# Patient Record
Sex: FEMALE | Race: WHITE | NOT HISPANIC OR LATINO | Employment: FULL TIME | RURAL
[De-identification: names, ages, dates, MRNs, and addresses within clinical notes are randomized per-mention and may not be internally consistent; named-entity substitution may affect disease eponyms.]

---

## 2018-01-03 ENCOUNTER — OUTSIDE FACILITY SERVICE (OUTPATIENT)
Dept: CARDIOLOGY | Facility: CLINIC | Age: 38
End: 2018-01-03

## 2018-01-03 PROCEDURE — 93018 CV STRESS TEST I&R ONLY: CPT | Performed by: INTERNAL MEDICINE

## 2018-01-03 PROCEDURE — 78452 HT MUSCLE IMAGE SPECT MULT: CPT | Performed by: INTERNAL MEDICINE

## 2021-06-16 ENCOUNTER — OFFICE VISIT (OUTPATIENT)
Dept: OBSTETRICS AND GYNECOLOGY | Facility: CLINIC | Age: 41
End: 2021-06-16

## 2021-06-16 ENCOUNTER — TELEPHONE (OUTPATIENT)
Dept: OBSTETRICS AND GYNECOLOGY | Facility: CLINIC | Age: 41
End: 2021-06-16

## 2021-06-16 VITALS — HEIGHT: 65 IN | SYSTOLIC BLOOD PRESSURE: 110 MMHG | DIASTOLIC BLOOD PRESSURE: 62 MMHG

## 2021-06-16 DIAGNOSIS — N89.8 VAGINAL DISCHARGE: Primary | ICD-10-CM

## 2021-06-16 DIAGNOSIS — O20.9 BLEEDING IN EARLY PREGNANCY: Primary | ICD-10-CM

## 2021-06-16 DIAGNOSIS — O20.0 THREATENED ABORTION: ICD-10-CM

## 2021-06-16 PROCEDURE — 99203 OFFICE O/P NEW LOW 30 MIN: CPT | Performed by: NURSE PRACTITIONER

## 2021-06-16 RX ORDER — PRENATAL VIT NO.126/IRON/FOLIC 28MG-0.8MG
TABLET ORAL DAILY
COMMUNITY

## 2021-06-16 RX ORDER — LEVOTHYROXINE SODIUM 0.12 MG/1
125 TABLET ORAL DAILY
COMMUNITY
End: 2021-07-16

## 2021-06-16 NOTE — PROGRESS NOTES
"Chief Complaint   Patient presents with   • New Patient   • TAB          HPI  Mitali Glover is a 40 y.o. female, , who presents with bleeding.  The amount of bleeding is described as light for 5 days without clotting, and cramping.  She describes cramping mild period like cramping in her lower abdomen.     Recent Tests:  She had a urine pregnancy test that was done 21 that was positive..    US today: Yes.  Findings showed single, viable IUP measuring 9w0d with FHR= 167bpm., Right ovarian cyst 44mm present.   I have personally evaluated the U/S and agree with the findings. MONICA Cope  She has had prenatal care with another practice, and plans to transfer care to us. [facility] .  She complains of cramping pain.  The pain is located in her lower abdomen.. Her past medical history is notable for ectopic pregnancy and spontaneous .  She does not have passage of tissue.  Rh Status: Positive per patient, we do not have record.    She also complains of increased urinary frequency.  She reports that she has been going 4 times per hour.      The additional following portions of the patient's history were reviewed and updated as appropriate: allergies, current medications, past family history, past medical history, past social history, past surgical history and problem list.    Review of Systems   Constitutional: Positive for fatigue.   Gastrointestinal: Negative.    Genitourinary: Positive for frequency, pelvic pain and vaginal bleeding.     All other systems reviewed and are negative.     I have reviewed and agree with the HPI, ROS, and historical information as entered above. MONICA Cope    Objective   /62   Ht 165.1 cm (65\")   LMP 2021 (Exact Date)   Breastfeeding No     Physical Exam  Constitutional:       Appearance: Normal appearance. She is normal weight.   Cardiovascular:      Rate and Rhythm: Normal rate and regular rhythm. "   Neurological:      Mental Status: She is alert.            Assessment and Plan    Problem List Items Addressed This Visit     None      Visit Diagnoses     Vaginal discharge    -  Primary    Relevant Orders    Chlamydia trachomatis, Neisseria gonorrhoeae, Trichomonas vaginalis, PCR - Swab, Cervix    ABO / Rh    Threatened         Relevant Orders    Chlamydia trachomatis, Neisseria gonorrhoeae, Trichomonas vaginalis, PCR - Swab, Cervix    ABO / Rh          1. Threatened AB  2. Pelvic Rest.  No douching, intercourse or use of tampons.  3. Call for an increase in bleeding, abdominal pain, or fever.  Type & Rh lab only today  Ultrasound findings reviewed with pt. Growth appropriate with LMP. FHR= 167bpm  Pt. 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 Recommended she talk with employer to see if other type of work available. Should try to limit lifting to no more than 25 lbs.   Keep NOB appt. As scheduled with     Counseling was given to patient for the following topics: diagnostic results, instructions for management and prognosis . Total time of the encounter was 45 minutes and 20 minutes was spend counseling.      Arelis Clement, MONICA  2021

## 2021-06-16 NOTE — TELEPHONE ENCOUNTER
PT IS BLEEDING IN PREGNANCY, WENT TO  AND WAS FINE, SHE IS COMING TO OUR OFFICE AND GETTING RECORDS - OTHER OFFICE SAID BLEEDING, BABY FHT WERE GOOD

## 2021-06-16 NOTE — TELEPHONE ENCOUNTER
S/w pt she states she is about 10 weeks pregnant. She has had vaginal spotting since last Saturday and 2 days ago she went to the [facility] ED for this and they did urine test and blood tests and stated to patient that she was about 10wks along, patient was unsure of HCG level results. ED told pt. The FHR was present and that she needed to F/U with her new OB care team. LMP 4/9 approximately, MBT is A positive, she is having cramping with the vaginal spotting, she also is having diarrhea and feels like she is going to pass out.     PT. Added on for today to see Dami and have U/S before.

## 2021-06-19 LAB
ABO GROUP BLD: NORMAL
C TRACH RRNA SPEC QL NAA+PROBE: NEGATIVE
Lab: NORMAL
N GONORRHOEA RRNA SPEC QL NAA+PROBE: NEGATIVE
RH BLD: POSITIVE
T VAGINALIS DNA SPEC QL NAA+PROBE: NEGATIVE

## 2021-06-23 ENCOUNTER — INITIAL PRENATAL (OUTPATIENT)
Dept: OBSTETRICS AND GYNECOLOGY | Facility: CLINIC | Age: 41
End: 2021-06-23

## 2021-06-23 VITALS — DIASTOLIC BLOOD PRESSURE: 62 MMHG | BODY MASS INDEX: 25.03 KG/M2 | WEIGHT: 150.4 LBS | SYSTOLIC BLOOD PRESSURE: 118 MMHG

## 2021-06-23 DIAGNOSIS — Z3A.10 10 WEEKS GESTATION OF PREGNANCY: Primary | ICD-10-CM

## 2021-06-23 DIAGNOSIS — O09.521 MULTIGRAVIDA OF ADVANCED MATERNAL AGE IN FIRST TRIMESTER: ICD-10-CM

## 2021-06-23 PROBLEM — M72.2 PLANTAR FASCIITIS, BILATERAL: Status: ACTIVE | Noted: 2018-09-03

## 2021-06-23 PROBLEM — F31.9 BIPOLAR DEPRESSION (HCC): Status: ACTIVE | Noted: 2018-08-31

## 2021-06-23 PROBLEM — G47.00 INSOMNIA: Status: ACTIVE | Noted: 2017-11-15

## 2021-06-23 PROBLEM — K21.9 GASTROESOPHAGEAL REFLUX DISEASE WITHOUT ESOPHAGITIS: Status: ACTIVE | Noted: 2018-05-06

## 2021-06-23 PROBLEM — R76.8 ELEVATED ANTINUCLEAR ANTIBODY (ANA) LEVEL: Status: ACTIVE | Noted: 2018-05-06

## 2021-06-23 PROBLEM — E03.9 HYPOTHYROIDISM: Status: ACTIVE | Noted: 2018-02-12

## 2021-06-23 PROBLEM — R92.8 ABNORMALITY OF RIGHT BREAST ON SCREENING MAMMOGRAM: Status: ACTIVE | Noted: 2017-11-15

## 2021-06-23 PROBLEM — F41.9 ANXIETY: Status: ACTIVE | Noted: 2018-02-12

## 2021-06-23 PROCEDURE — 99214 OFFICE O/P EST MOD 30 MIN: CPT | Performed by: NURSE PRACTITIONER

## 2021-06-23 RX ORDER — ZOLPIDEM TARTRATE 10 MG/1
TABLET ORAL
COMMUNITY
Start: 2021-06-17 | End: 2021-09-02 | Stop reason: HOSPADM

## 2021-06-23 NOTE — PROGRESS NOTES
Initial ob visit     CC- Here for care of pregnancy        Mitali Glover is a 40 y.o. female, , who presents for her first obstetrical visit.  Her last LMP was Patient's last menstrual period was 2021 (exact date)..    She reports that she is transferring care from Dr. Evans @ [facility].  NOB is currently scheduled with them for 21.   She says that she has requested records from their office, but they are not in chart.  She called while in room and they confirmed that NOB labs have not been drawn, as she has had labs with them prior to being seen with us.      OB History    Para Term  AB Living   8 5 4 1 1 5   SAB TAB Ectopic Molar Multiple Live Births       1            # Outcome Date GA Lbr Awais/2nd Weight Sex Delivery Anes PTL Lv   8 Current            7             6 Ectopic            5             4 Term            3 Term            2 Term            1 Term                Initial positive test date : 2021, UPT          This was a surprise pregnancy.  She says that she found out at pre- op prior to tubal.     Prior obstetric issues, potential pregnancy concerns:  Pre-eclampsia with previous pregnancy, G6  Family history of genetic issues (includes FOB): Patient reports that she has two brothers with heart murmurs.    Prior infections concerning in pregnancy (Rash, fever in last 2 weeks): She  reports that she recently found out that FOB has Hep C.   Varicella Hx - Yes  Prior testing for Cystic Fibrosis Carrier or Sickle Cell Trait- No  Prepregnancy BMI - Body mass index is 25.03 kg/m².  History of STD: no  Ultrasound Today: Yes.  Findings showed single, viable IUP measuring 10w0d with FHR= 178bpm.  I have personally evaluated the U/S and agree with the findings. Arelis Clement, APRN    Additional Pertinent History   Last Pap : She reports approx , she says that it wasn't normal but says that she was told it was nothing to worry about.  Last  "Completed Pap Smear     This patient has no relevant Health Maintenance data.        History of abnormal Pap smear: yes - she reports that she has a hx of a growth on her cervix and had to have removed  Family history of uterine, colon, breast, or ovarian cancer: yes - mother, Paternal Aunt- Breast   Feelings of Anxiety or Depression: yes - both anxiety and depression.  She says that her last OB gave her zoloft, but she has not taken it.  She says that she \"just has not been home.\"   Tobacco Usage?: No   Alcohol/Drug Use?: NO  Over the age of 35 at delivery: yes  Desires Genetic Screening: Cell Free DNA      [facility]  Past Medical History:   Diagnosis Date   • Hashimoto's disease    • Hypothyroid    • Preeclampsia        Current Outpatient Medications:   •  levothyroxine (SYNTHROID, LEVOTHROID) 125 MCG tablet, Take 125 mcg by mouth Daily., Disp: , Rfl:   •  prenatal vitamin (prenatal, CLASSIC, vitamin) tablet, Take  by mouth Daily., Disp: , Rfl:   •  zolpidem (AMBIEN) 10 MG tablet, , Disp: , Rfl:     The additional following portions of the patient's history were reviewed and updated as appropriate: allergies, current medications, past family history, past medical history, past social history, past surgical history and problem list.    Review of Systems   Review of Systems   Constitutional: Negative.    Gastrointestinal: Negative.    Genitourinary: Negative.    Psychiatric/Behavioral: The patient is nervous/anxious.      Current obstetric complaints : Nausea   All systems reviewed and otherwise normal.    I have reviewed and agree with the HPI, ROS, and historical information as entered above. Arelis Urbano, APRN    /62   Wt 68.2 kg (150 lb 6.4 oz)   LMP 04/09/2021 (Exact Date)   BMI 25.03 kg/m²     Physical Exam  General:  well developed; well nourished  no acute distress   Chest/Respiratory: No labored breathing, normal respiratory effort, normal appearance, no respiratory noises noted   Heart:  normal rate, " regular rhythm,  no murmurs, rubs, or gallops   Thyroid: not examined   Breasts:  Not performed.   Abdomen: Not performed.   Pelvis: Not performed.        Assessment and Plan    Problem List Items Addressed This Visit     None      Visit Diagnoses     10 weeks gestation of pregnancy    -  Primary    Relevant Orders    Obstetric Panel    HIV-1 / O / 2 Ag / Antibody 4th Generation    Urine Drug Screen - Urine, Clean Catch    Urine Culture - Urine, Urine, Clean Catch    [id number] PLUS Core+SCA+ESS - Blood,    Multigravida of advanced maternal age in first trimester        Relevant Orders    [id number] PLUS Core+SCA+ESS - Blood,          1. Pregnancy at 10w0d  2. Reviewed routine prenatal care with the office and educational materials given  3. Lab(s) Ordered  4. Discussed options for genetic testing including first trimester nuchal translucency screen, genetic disease carrier testing, quadruple screen, and [place].  5. Discontinue the use of all non-medicinal drugs and chemicals  6. Nausea/Vomiting - she does not desire medications at this time.  Discussed conservative ways to help with nausea.  7. Patient is on Prenatal vitamins  8. Activity recommendation : 150 minutes/week of moderate intensity aerobic activity unless we limit for bleeding, hypertension or other pregnancy complication   9.   Materni 21 labs with NOB labs today  10. RTC in 4 weeks with Dr.Schell Arelis Clement, APRN  06/23/2021

## 2021-06-24 ENCOUNTER — PATIENT MESSAGE (OUTPATIENT)
Dept: OBSTETRICS AND GYNECOLOGY | Facility: CLINIC | Age: 41
End: 2021-06-24

## 2021-06-25 LAB
ABO GROUP BLD: NORMAL
AMPHETAMINES UR QL SCN: NEGATIVE NG/ML
BACTERIA UR CULT: NO GROWTH
BACTERIA UR CULT: NORMAL
BARBITURATES UR QL SCN: NEGATIVE NG/ML
BASOPHILS # BLD AUTO: 0 X10E3/UL (ref 0–0.2)
BASOPHILS NFR BLD AUTO: 0 %
BENZODIAZ UR QL SCN: NEGATIVE NG/ML
BLD GP AB SCN SERPL QL: NEGATIVE
BZE UR QL SCN: NEGATIVE NG/ML
CANNABINOIDS UR QL SCN: NEGATIVE NG/ML
CREAT UR-MCNC: 142.8 MG/DL (ref 20–300)
EOSINOPHIL # BLD AUTO: 0 X10E3/UL (ref 0–0.4)
EOSINOPHIL NFR BLD AUTO: 0 %
ERYTHROCYTE [DISTWIDTH] IN BLOOD BY AUTOMATED COUNT: 13.9 % (ref 11.7–15.4)
HBV SURFACE AG SERPL QL IA: NEGATIVE
HCT VFR BLD AUTO: 38.2 % (ref 34–46.6)
HCV AB S/CO SERPL IA: <0.1 S/CO RATIO (ref 0–0.9)
HGB BLD-MCNC: 12.8 G/DL (ref 11.1–15.9)
HIV 1+2 AB+HIV1 P24 AG SERPL QL IA: NON REACTIVE
IMM GRANULOCYTES # BLD AUTO: 0 X10E3/UL (ref 0–0.1)
IMM GRANULOCYTES NFR BLD AUTO: 0 %
LABORATORY COMMENT REPORT: NORMAL
LYMPHOCYTES # BLD AUTO: 1.7 X10E3/UL (ref 0.7–3.1)
LYMPHOCYTES NFR BLD AUTO: 21 %
MCH RBC QN AUTO: 29.5 PG (ref 26.6–33)
MCHC RBC AUTO-ENTMCNC: 33.5 G/DL (ref 31.5–35.7)
MCV RBC AUTO: 88 FL (ref 79–97)
METHADONE UR QL SCN: NEGATIVE NG/ML
MONOCYTES # BLD AUTO: 0.4 X10E3/UL (ref 0.1–0.9)
MONOCYTES NFR BLD AUTO: 6 %
NEUTROPHILS # BLD AUTO: 5.7 X10E3/UL (ref 1.4–7)
NEUTROPHILS NFR BLD AUTO: 73 %
OPIATES UR QL SCN: NEGATIVE NG/ML
OXYCODONE+OXYMORPHONE UR QL SCN: NEGATIVE NG/ML
PCP UR QL: NEGATIVE NG/ML
PH UR: 5.9 [PH] (ref 4.5–8.9)
PLATELET # BLD AUTO: 279 X10E3/UL (ref 150–450)
PROPOXYPH UR QL SCN: NEGATIVE NG/ML
RBC # BLD AUTO: 4.34 X10E6/UL (ref 3.77–5.28)
RH BLD: POSITIVE
RPR SER QL: NON REACTIVE
RUBV IGG SERPL IA-ACNC: 1.32 INDEX
WBC # BLD AUTO: 7.8 X10E3/UL (ref 3.4–10.8)

## 2021-07-14 ENCOUNTER — TELEPHONE (OUTPATIENT)
Dept: OBSTETRICS AND GYNECOLOGY | Facility: CLINIC | Age: 41
End: 2021-07-14
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
## 2021-07-15 ENCOUNTER — ROUTINE PRENATAL (OUTPATIENT)
Dept: OBSTETRICS AND GYNECOLOGY | Facility: CLINIC | Age: 41
End: 2021-07-15

## 2021-07-15 VITALS — BODY MASS INDEX: 24.79 KG/M2 | SYSTOLIC BLOOD PRESSURE: 110 MMHG | DIASTOLIC BLOOD PRESSURE: 64 MMHG | WEIGHT: 149 LBS

## 2021-07-15 DIAGNOSIS — Z3A.13 13 WEEKS GESTATION OF PREGNANCY: Primary | ICD-10-CM

## 2021-07-15 DIAGNOSIS — Z87.828 HISTORY OF RECENT TRAUMA: ICD-10-CM

## 2021-07-15 DIAGNOSIS — E03.9 HYPOTHYROIDISM, UNSPECIFIED TYPE: ICD-10-CM

## 2021-07-15 DIAGNOSIS — O20.9 BLEEDING IN EARLY PREGNANCY: ICD-10-CM

## 2021-07-15 DIAGNOSIS — O09.529 ANTEPARTUM MULTIGRAVIDA OF ADVANCED MATERNAL AGE: ICD-10-CM

## 2021-07-15 DIAGNOSIS — F31.9 BIPOLAR DEPRESSION (HCC): ICD-10-CM

## 2021-07-15 DIAGNOSIS — Z87.59 HISTORY OF PREGNANCY INDUCED HYPERTENSION: ICD-10-CM

## 2021-07-15 PROBLEM — R92.8 ABNORMALITY OF RIGHT BREAST ON SCREENING MAMMOGRAM: Status: RESOLVED | Noted: 2017-11-15 | Resolved: 2021-07-15

## 2021-07-15 LAB
GLUCOSE UR STRIP-MCNC: NEGATIVE MG/DL
PROT UR STRIP-MCNC: NEGATIVE MG/DL

## 2021-07-15 PROCEDURE — 99213 OFFICE O/P EST LOW 20 MIN: CPT | Performed by: OBSTETRICS & GYNECOLOGY

## 2021-07-16 LAB — TSH SERPL DL<=0.005 MIU/L-ACNC: 4.44 UIU/ML (ref 0.27–4.2)

## 2021-07-16 RX ORDER — LEVOTHYROXINE SODIUM 0.15 MG/1
150 TABLET ORAL DAILY
Qty: 30 TABLET | Refills: 2 | Status: SHIPPED | OUTPATIENT
Start: 2021-07-16 | End: 2021-08-19

## 2021-08-12 ENCOUNTER — ROUTINE PRENATAL (OUTPATIENT)
Dept: OBSTETRICS AND GYNECOLOGY | Facility: CLINIC | Age: 41
End: 2021-08-12

## 2021-08-12 VITALS — DIASTOLIC BLOOD PRESSURE: 70 MMHG | BODY MASS INDEX: 24.79 KG/M2 | SYSTOLIC BLOOD PRESSURE: 118 MMHG | WEIGHT: 149 LBS

## 2021-08-12 DIAGNOSIS — Z87.828 HISTORY OF RECENT TRAUMA: ICD-10-CM

## 2021-08-12 DIAGNOSIS — O09.529 ANTEPARTUM MULTIGRAVIDA OF ADVANCED MATERNAL AGE: ICD-10-CM

## 2021-08-12 DIAGNOSIS — E03.9 HYPOTHYROIDISM, UNSPECIFIED TYPE: ICD-10-CM

## 2021-08-12 DIAGNOSIS — Z87.59 HISTORY OF PREGNANCY INDUCED HYPERTENSION: ICD-10-CM

## 2021-08-12 DIAGNOSIS — F31.9 BIPOLAR DEPRESSION (HCC): ICD-10-CM

## 2021-08-12 DIAGNOSIS — Z3A.17 17 WEEKS GESTATION OF PREGNANCY: Primary | ICD-10-CM

## 2021-08-12 LAB
GLUCOSE UR STRIP-MCNC: NEGATIVE MG/DL
PROT UR STRIP-MCNC: NEGATIVE MG/DL

## 2021-08-12 PROCEDURE — 99214 OFFICE O/P EST MOD 30 MIN: CPT | Performed by: OBSTETRICS & GYNECOLOGY
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
## 2021-08-13 LAB — TSH SERPL DL<=0.005 MIU/L-ACNC: 3.85 UIU/ML (ref 0.27–4.2)

## 2021-08-14 LAB
AFP ADJ MOM SERPL: 0.97
AFP INTERP SERPL-IMP: NORMAL
AFP INTERP SERPL-IMP: NORMAL
AFP SERPL-MCNC: 38.8 NG/ML
AGE AT DELIVERY: 43.2 YR
GA METHOD: NORMAL
GA: 17.1 WEEKS
IDDM PATIENT QL: NO
LABORATORY COMMENT REPORT: NORMAL
MULTIPLE PREGNANCY: NO
NEURAL TUBE DEFECT RISK FETUS: NORMAL %
RESULT: NORMAL

## 2021-08-18 ENCOUNTER — TELEPHONE (OUTPATIENT)
Dept: OBSTETRICS AND GYNECOLOGY | Facility: CLINIC | Age: 41
End: 2021-08-18

## 2021-08-18 DIAGNOSIS — O46.92 VAGINAL BLEEDING IN PREGNANCY, SECOND TRIMESTER: Primary | ICD-10-CM

## 2021-08-18 NOTE — TELEPHONE ENCOUNTER
Patient reports she went to [facility] for vaginal bleeding. Heart tones were present and no leakage of amniotic fluid. States she started bleeding Monday morning in early AM, so much so, that she was bleeding through her shorts. Bright red. Had cramping/pain with the bleeding. Denies abdominal trauma, heavy exercise, or intercourse prior to bleed. States that the bleeding slowed down by the time she made it to the ER. Has not had spotting, cramping, or bleeding since that day. MBT A positive.    [facility] [place] put patient on pelvic rest until f/u OB appointment.    In regards to our phone call to patient, she reports she takes her thyroid medication every day, as soon as she wakes.    Appointment with Dr. Ross tomorrow, following f/u U/S at 8:00am.

## 2021-08-18 NOTE — TELEPHONE ENCOUNTER
Pt called and stated that she missed a call from our office and her voicemail is messed up and she cant listen to it.     Pt also stated that she had been in the ER for bleeding and they said that the baby was fine and that she should let her OBGYN know.

## 2021-08-19 ENCOUNTER — ROUTINE PRENATAL (OUTPATIENT)
Dept: OBSTETRICS AND GYNECOLOGY | Facility: CLINIC | Age: 41
End: 2021-08-19

## 2021-08-19 VITALS — SYSTOLIC BLOOD PRESSURE: 112 MMHG | DIASTOLIC BLOOD PRESSURE: 70 MMHG

## 2021-08-19 DIAGNOSIS — Z87.59 HISTORY OF PREGNANCY INDUCED HYPERTENSION: ICD-10-CM

## 2021-08-19 DIAGNOSIS — Z87.828 HISTORY OF RECENT TRAUMA: ICD-10-CM

## 2021-08-19 DIAGNOSIS — Z3A.18 18 WEEKS GESTATION OF PREGNANCY: Primary | ICD-10-CM

## 2021-08-19 DIAGNOSIS — F31.9 BIPOLAR DEPRESSION (HCC): ICD-10-CM

## 2021-08-19 DIAGNOSIS — E03.9 HYPOTHYROIDISM, UNSPECIFIED TYPE: ICD-10-CM

## 2021-08-19 DIAGNOSIS — O46.92 VAGINAL BLEEDING IN PREGNANCY, SECOND TRIMESTER: ICD-10-CM

## 2021-08-19 DIAGNOSIS — O09.529 ANTEPARTUM MULTIGRAVIDA OF ADVANCED MATERNAL AGE: ICD-10-CM

## 2021-08-19 LAB
EXPIRATION DATE: 0
GLUCOSE UR STRIP-MCNC: NEGATIVE MG/DL
Lab: 0
PROT UR STRIP-MCNC: NEGATIVE MG/DL

## 2021-08-19 PROCEDURE — 99214 OFFICE O/P EST MOD 30 MIN: CPT | Performed by: OBSTETRICS & GYNECOLOGY

## 2021-08-19 RX ORDER — LEVOTHYROXINE SODIUM 175 UG/1
175 TABLET ORAL DAILY
Qty: 30 TABLET | Refills: 5 | Status: SHIPPED | OUTPATIENT
Start: 2021-08-19

## 2021-08-19 RX ORDER — LEVOTHYROXINE SODIUM 0.15 MG/1
175 TABLET ORAL DAILY
Qty: 30 TABLET | Refills: 0 | Status: SHIPPED | OUTPATIENT
Start: 2021-08-19 | End: 2021-08-19
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
## 2021-08-26 ENCOUNTER — TELEPHONE (OUTPATIENT)
Dept: OBSTETRICS AND GYNECOLOGY | Facility: CLINIC | Age: 41
End: 2021-08-26

## 2021-08-26 NOTE — TELEPHONE ENCOUNTER
She had been taking One a Day PNV OTC and she is not able to get it since she lost her job. She has a PNV Rx on file but it is chalky. That was not prescribed by Dr. Ross. She has [employer] and they will not cover a PNV with DHA. Options: try Vitafusion gummy, [place] gummy call her insurance and see what is covered or call a [facility] and see if they have a free PNV with DHA if so call me back.

## 2021-08-31 ENCOUNTER — HOSPITAL ENCOUNTER (INPATIENT)
Facility: HOSPITAL | Age: 41
LOS: 1 days | Discharge: HOME | End: 2021-09-02
Attending: OBSTETRICS & GYNECOLOGY | Admitting: OBSTETRICS & GYNECOLOGY

## 2021-08-31 PROCEDURE — 81001 URINALYSIS AUTO W/SCOPE: CPT | Performed by: OBSTETRICS & GYNECOLOGY

## 2021-09-01 ENCOUNTER — APPOINTMENT (OUTPATIENT)
Dept: WOMENS IMAGING | Facility: HOSPITAL | Age: 41
End: 2021-09-01

## 2021-09-01 PROBLEM — O46.92 VAGINAL BLEEDING IN PREGNANCY, SECOND TRIMESTER: Status: ACTIVE | Noted: 2021-09-01

## 2021-09-01 LAB
ABO GROUP BLD: NORMAL
ABO GROUP BLD: NORMAL
BACTERIA UR QL AUTO: ABNORMAL /HPF
BILIRUB UR QL STRIP: NEGATIVE
BLD GP AB SCN SERPL QL: NEGATIVE
CLARITY UR: CLEAR
COLOR UR: YELLOW
DEPRECATED RDW RBC AUTO: 42.8 FL (ref 37–54)
ERYTHROCYTE [DISTWIDTH] IN BLOOD BY AUTOMATED COUNT: 13.2 % (ref 12.3–15.4)
FIBRINOGEN PPP-MCNC: 365 MG/DL (ref 220–470)
GLUCOSE UR STRIP-MCNC: NEGATIVE MG/DL
HCT VFR BLD AUTO: 34.2 % (ref 34–46.6)
HGB BLD-MCNC: 11.7 G/DL (ref 12–15.9)
HGB UR QL STRIP.AUTO: ABNORMAL
HYALINE CASTS UR QL AUTO: ABNORMAL /LPF
KETONES UR QL STRIP: ABNORMAL
LEUKOCYTE ESTERASE UR QL STRIP.AUTO: ABNORMAL
MCH RBC QN AUTO: 30.6 PG (ref 26.6–33)
MCHC RBC AUTO-ENTMCNC: 34.2 G/DL (ref 31.5–35.7)
MCV RBC AUTO: 89.5 FL (ref 79–97)
NITRITE UR QL STRIP: NEGATIVE
PH UR STRIP.AUTO: 6 [PH] (ref 5–8)
PLATELET # BLD AUTO: 249 10*3/MM3 (ref 140–450)
PMV BLD AUTO: 10.1 FL (ref 6–12)
PROT UR QL STRIP: NEGATIVE
RBC # BLD AUTO: 3.82 10*6/MM3 (ref 3.77–5.28)
RBC # UR: ABNORMAL /HPF
REF LAB TEST METHOD: ABNORMAL
RH BLD: POSITIVE
RH BLD: POSITIVE
SARS-COV-2 RDRP RESP QL NAA+PROBE: NORMAL
SP GR UR STRIP: 1.03 (ref 1–1.03)
SQUAMOUS #/AREA URNS HPF: ABNORMAL /HPF
T&S EXPIRATION DATE: NORMAL
UROBILINOGEN UR QL STRIP: ABNORMAL
WBC # BLD AUTO: 8.52 10*3/MM3 (ref 3.4–10.8)
WBC UR QL AUTO: ABNORMAL /HPF

## 2021-09-01 PROCEDURE — 99222 1ST HOSP IP/OBS MODERATE 55: CPT | Performed by: OBSTETRICS & GYNECOLOGY

## 2021-09-01 PROCEDURE — 87635 SARS-COV-2 COVID-19 AMP PRB: CPT | Performed by: OBSTETRICS & GYNECOLOGY

## 2021-09-01 PROCEDURE — 25010000002 ONDANSETRON PER 1 MG: Performed by: OBSTETRICS & GYNECOLOGY

## 2021-09-01 PROCEDURE — 85384 FIBRINOGEN ACTIVITY: CPT | Performed by: OBSTETRICS & GYNECOLOGY

## 2021-09-01 PROCEDURE — 76811 OB US DETAILED SNGL FETUS: CPT

## 2021-09-01 PROCEDURE — 76811 OB US DETAILED SNGL FETUS: CPT | Performed by: OBSTETRICS & GYNECOLOGY

## 2021-09-01 PROCEDURE — 85027 COMPLETE CBC AUTOMATED: CPT | Performed by: OBSTETRICS & GYNECOLOGY

## 2021-09-01 PROCEDURE — 86900 BLOOD TYPING SEROLOGIC ABO: CPT | Performed by: OBSTETRICS & GYNECOLOGY

## 2021-09-01 PROCEDURE — 86901 BLOOD TYPING SEROLOGIC RH(D): CPT

## 2021-09-01 PROCEDURE — 86901 BLOOD TYPING SEROLOGIC RH(D): CPT | Performed by: OBSTETRICS & GYNECOLOGY

## 2021-09-01 PROCEDURE — 86850 RBC ANTIBODY SCREEN: CPT | Performed by: OBSTETRICS & GYNECOLOGY

## 2021-09-01 PROCEDURE — 25010000003 MAGNESIUM SULFATE 20 GM/500ML SOLUTION: Performed by: OBSTETRICS & GYNECOLOGY

## 2021-09-01 PROCEDURE — 86900 BLOOD TYPING SEROLOGIC ABO: CPT

## 2021-09-01 RX ORDER — NALOXONE HCL 0.4 MG/ML
0.4 VIAL (ML) INJECTION
Status: DISCONTINUED | OUTPATIENT
Start: 2021-09-01 | End: 2021-09-02 | Stop reason: HOSPADM

## 2021-09-01 RX ORDER — ACETAMINOPHEN 500 MG
1000 TABLET ORAL EVERY 4 HOURS PRN
Status: DISCONTINUED | OUTPATIENT
Start: 2021-09-01 | End: 2021-09-01

## 2021-09-01 RX ORDER — SODIUM CHLORIDE 0.9 % (FLUSH) 0.9 %
3 SYRINGE (ML) INJECTION EVERY 12 HOURS SCHEDULED
Status: DISCONTINUED | OUTPATIENT
Start: 2021-09-01 | End: 2021-09-02 | Stop reason: HOSPADM

## 2021-09-01 RX ORDER — ONDANSETRON 2 MG/ML
4 INJECTION INTRAMUSCULAR; INTRAVENOUS EVERY 8 HOURS PRN
Status: DISCONTINUED | OUTPATIENT
Start: 2021-09-01 | End: 2021-09-02 | Stop reason: HOSPADM

## 2021-09-01 RX ORDER — DEXTROSE AND SODIUM CHLORIDE 5; .2 G/100ML; G/100ML
75 INJECTION, SOLUTION INTRAVENOUS CONTINUOUS
Status: DISCONTINUED | OUTPATIENT
Start: 2021-09-01 | End: 2021-09-02 | Stop reason: HOSPADM

## 2021-09-01 RX ORDER — LIDOCAINE HYDROCHLORIDE 10 MG/ML
5 INJECTION, SOLUTION EPIDURAL; INFILTRATION; INTRACAUDAL; PERINEURAL AS NEEDED
Status: DISCONTINUED | OUTPATIENT
Start: 2021-09-01 | End: 2021-09-02 | Stop reason: HOSPADM

## 2021-09-01 RX ORDER — ZOLPIDEM TARTRATE 5 MG/1
5 TABLET ORAL NIGHTLY PRN
Status: DISCONTINUED | OUTPATIENT
Start: 2021-09-01 | End: 2021-09-02 | Stop reason: HOSPADM

## 2021-09-01 RX ORDER — MAGNESIUM SULFATE HEPTAHYDRATE 40 MG/ML
2 INJECTION, SOLUTION INTRAVENOUS CONTINUOUS
Status: DISCONTINUED | OUTPATIENT
Start: 2021-09-01 | End: 2021-09-01

## 2021-09-01 RX ORDER — SODIUM CHLORIDE 0.9 % (FLUSH) 0.9 %
10 SYRINGE (ML) INJECTION AS NEEDED
Status: DISCONTINUED | OUTPATIENT
Start: 2021-09-01 | End: 2021-09-02 | Stop reason: HOSPADM

## 2021-09-01 RX ORDER — ONDANSETRON 4 MG/1
4 TABLET, FILM COATED ORAL EVERY 8 HOURS PRN
Status: DISCONTINUED | OUTPATIENT
Start: 2021-09-01 | End: 2021-09-02 | Stop reason: HOSPADM

## 2021-09-01 RX ORDER — MAGNESIUM SULFATE HEPTAHYDRATE 40 MG/ML
2 INJECTION, SOLUTION INTRAVENOUS CONTINUOUS
Status: DISCONTINUED | OUTPATIENT
Start: 2021-09-01 | End: 2021-09-02 | Stop reason: HOSPADM

## 2021-09-01 RX ORDER — BUTORPHANOL TARTRATE 1 MG/ML
1 INJECTION, SOLUTION INTRAMUSCULAR; INTRAVENOUS EVERY 4 HOURS PRN
Status: DISCONTINUED | OUTPATIENT
Start: 2021-09-01 | End: 2021-09-02 | Stop reason: HOSPADM

## 2021-09-01 RX ORDER — ACETAMINOPHEN 325 MG/1
650 TABLET ORAL EVERY 4 HOURS PRN
Status: DISCONTINUED | OUTPATIENT
Start: 2021-09-01 | End: 2021-09-02 | Stop reason: HOSPADM

## 2021-09-01 RX ADMIN — MAGNESIUM SULFATE HEPTAHYDRATE 2 G/HR: 40 INJECTION, SOLUTION INTRAVENOUS at 10:29

## 2021-09-01 RX ADMIN — ACETAMINOPHEN 1000 MG: 500 TABLET ORAL at 11:50

## 2021-09-01 RX ADMIN — ACETAMINOPHEN 1000 MG: 500 TABLET ORAL at 06:31

## 2021-09-01 RX ADMIN — DEXTROSE AND SODIUM CHLORIDE 100 ML/HR: 5; 200 INJECTION, SOLUTION INTRAVENOUS at 01:01

## 2021-09-01 RX ADMIN — ONDANSETRON 4 MG: 2 INJECTION INTRAMUSCULAR; INTRAVENOUS at 20:14

## 2021-09-01 RX ADMIN — DEXTROSE AND SODIUM CHLORIDE 100 ML/HR: 5; 200 INJECTION, SOLUTION INTRAVENOUS at 11:26

## 2021-09-01 RX ADMIN — ACETAMINOPHEN 650 MG: 325 TABLET, FILM COATED ORAL at 18:40

## 2021-09-01 RX ADMIN — MAGNESIUM SULFATE HEPTAHYDRATE 2 G/HR: 40 INJECTION, SOLUTION INTRAVENOUS at 20:14

## 2021-09-01 RX ADMIN — ACETAMINOPHEN 1000 MG: 500 TABLET ORAL at 01:31

## 2021-09-01 RX ADMIN — ONDANSETRON 4 MG: 2 INJECTION INTRAMUSCULAR; INTRAVENOUS at 01:05

## 2021-09-01 RX ADMIN — LEVOTHYROXINE SODIUM 175 MCG: 100 TABLET ORAL at 06:28
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
## 2021-09-01 NOTE — H&P
"Mitali Glover  1980  [id number]  [id number]    CC: bleeding, cramping  HPI:  Patient is 40 y.o. female   currently at 20w0d presents with c/o vaginal bleeding and cramping.  Bleeding is bright red, sm-mod amt.  Cramping is 5/10, lower abd, intermittent.  Pt denies recent intercourse.  Pt with vag bleeding , US normal.  PNC comp by hx preeclampsia with prev pregnancies, hx  delivery, advanced maternal age, hypothyroidism.  Baby is moving well.    PMH:  Current meds: PNV, synthroid 175mcg/d  Illnesses: hypothyroidism, pericardial cyst  Surgeries: thoracoscopy with removal pericardial cyst, arm surg (trauma), lap lori, oral     surg  Allergies: NKDA    Past OB History:       OB History    Para Term  AB Living   8 5 4 1 2 5   SAB TAB Ectopic Molar Multiple Live Births   1 0 1 0 0 5      # Outcome Date GA Lbr Awais/2nd Weight Sex Delivery Anes PTL Lv   8 Current            7 SAB 21 6w0d          6  11 35w0d  2580 g (5 lb 11 oz) M Vag-Spont   JEWELS      Complications: Preeclampsia   5 Term 06 39w0d  3629 g (8 lb) F Vag-Spont   JEWELS   4 Term 05 38w0d  3232 g (7 lb 2 oz) F Vag-Spont  N JEWELS   3 Term 03 40w0d  3629 g (8 lb) F Vag-Spont  Y JEWELS   2 Ectopic 02           1 Term 00 39w3d  3487 g (7 lb 11 oz) F Vag-Spont  Y JEWELS            SH: tob neg , EtOH neg, drugs neg  FH: heart dz neg , diabetes pos , cancer pos    General ROS:  Cramping, vag bleeding, HA, edema.   All other systems reviewed and are negative.      Physical Examination: General appearance - alert, well appearing, and in no distress  Vital signs - /61   Pulse 76   Temp 98 °F (36.7 °C) (Oral)   Resp 16   Ht 165.1 cm (65\")   Wt 68 kg (150 lb)   LMP 2021 (Exact Date)   BMI 24.96 kg/m²   HEENT: normocephalic, atraumatic,oropharynx clear, appearance of ears and nose normal  Neck - supple, no significant adenopathy, no thyromegaly  Lymphatics - no palpable " lymphadenopathy in the neck or groin, no hepatosplenomegaly  Chest - clear to auscultation, no wheezes, rales or rhonchi, respiratory effort non-labored  Heart - normal rate, regular rhythm, no murmurs, rubs, clicks or gallops, no JVD, no lower extremity edema  Abdomen - soft, nontender, nondistended, no masses, no hepatosplenomegaly  no rebound tenderness noted, bowel sounds normal  Vaginal Exam: cl/th, small amt blood in vault ,external genitalia normal  Extremities - no pedal edema noted, no calf tend  Skin -warm and dry, normal coloration and turgor, no rashes, no suspicious skin lesions noted      Radiology US: anterior/low lying placenta (TV US), poss subchorionic clot, normal fluid, active fetus    Assessment 1)IUP 20 0/7 weeks   2)bleeding/cramping- prob due to subchorionic bleed   3)adv mat age   4)hx preeclampsia   5)hx  delivery    Plan 1)admit   2)magnesium   3)PDC consult and scan in am   4)labs   5)discussed with Dr.s Marti and Vandana Botello MD  2021  00:24 EDT

## 2021-09-02 ENCOUNTER — HOSPITAL ENCOUNTER (OUTPATIENT)
Dept: WOMENS IMAGING | Facility: HOSPITAL | Age: 41
End: 2021-09-02

## 2021-09-02 VITALS
HEIGHT: 65 IN | WEIGHT: 150 LBS | DIASTOLIC BLOOD PRESSURE: 56 MMHG | SYSTOLIC BLOOD PRESSURE: 122 MMHG | HEART RATE: 82 BPM | BODY MASS INDEX: 24.99 KG/M2 | TEMPERATURE: 98.9 F | OXYGEN SATURATION: 96 % | RESPIRATION RATE: 18 BRPM

## 2021-09-02 PROCEDURE — 99238 HOSP IP/OBS DSCHRG MGMT 30/<: CPT | Performed by: OBSTETRICS & GYNECOLOGY

## 2021-09-02 PROCEDURE — 25010000003 MAGNESIUM SULFATE 20 GM/500ML SOLUTION: Performed by: OBSTETRICS & GYNECOLOGY

## 2021-09-02 RX ADMIN — MAGNESIUM SULFATE HEPTAHYDRATE 2 G/HR: 40 INJECTION, SOLUTION INTRAVENOUS at 06:22

## 2021-09-02 RX ADMIN — ACETAMINOPHEN 650 MG: 325 TABLET, FILM COATED ORAL at 04:19

## 2021-09-02 RX ADMIN — ACETAMINOPHEN 650 MG: 325 TABLET, FILM COATED ORAL at 11:05

## 2021-09-02 RX ADMIN — LEVOTHYROXINE SODIUM 175 MCG: 100 TABLET ORAL at 06:19

## 2021-09-02 NOTE — CASE MANAGEMENT/SOCIAL WORK
Continued Stay Note  [facility]     Patient Name: Mitali Glover  MRN: [id number]  Today's Date: 9/2/2021    Admit Date: 8/31/2021    Discharge Plan     Row Name 09/02/21 1020       Plan    Plan  CHRISTINE follow-up    Plan Comments  SW spoke with pt today prior to discharge.  Pt confirmed she does have a DVO against FOB.  She had not planned on being admitted and doesn't have the paperwork with her.  She is aware she can present it if needed at a future MD appointment.  She stated she has her own apartment where she endorses feeling safe.  She plans to return home today and denies having any needs of SW.    Final Discharge Disposition Code  30 - still a patient        Discharge Codes    No documentation.       Expected Discharge Date and Time     Expected Discharge Date Expected Discharge Time    Sep 2, 2021             SHEREE Campbell

## 2021-09-02 NOTE — DISCHARGE SUMMARY
2021  HD:1  40 y.o. female  at 20w1d    Subjective   Mitali has no complaints. She has only had a small amount of pink blood with wiping. +FM.   US with PDC yesterday normal, except a 2cm CHRISTOPHER at leading edgeof placenta. No previa. CL normal.        Objective   Temp: Temp:  [97.7 °F (36.5 °C)-98.2 °F (36.8 °C)] 97.7 °F (36.5 °C) Temp src: Axillary   BP: BP: ()/(48-65) 104/57        Pulse: Heart Rate:  [68-81] 80  RR: Resp:  [15-16] 16    General:  nad   Abdomen: Gravid, nontender         Lab Results   Component Value Date    WBC 8.52 2021    HGB 11.7 (L) 2021    HCT 34.2 2021    MCV 89.5 2021     2021    HEPBSAG Negative 2021                 +FHT    Assessment  1.   40 y.o. yo female  at 20w1d  2. Small CHRISTOPHER with bleeding. Stable.     Plan  Will stop magnesium, and plan on home later today as long as remains stable. Pelvic rest, and decreased activity. She has FU sched next week.       This note has been electronically signed.    Leighann Ross MD  2021

## 2021-09-02 NOTE — DISCHARGE INSTRUCTIONS
Pelvic rest.  Decreased activity.  If you have HEAVY bleeding, call labor curry or come in to be evaluated ([phone]).

## 2021-09-03 NOTE — PAYOR COMM NOTE
"Mitali Rm (40 y.o. Female)   ID: [id number]    Pt DC home on 21  Faxed By: Marge Sheppard [phone] p, [phone] f      Date of Birth Social Security Number Address Home Phone MRN    1980  [address] [facility] [address] [phone] [id number]    [place] Marital Status          Unknown        Admission Date Admission Type Admitting Provider Attending Provider Department, Room/Bed    21 Elective Leighann Ross MD  [facility] ANTEPARTUM, N334/1    Discharge Date Discharge Disposition Discharge Destination        2021 Home or Self Care              Attending Provider: (none)   Allergies: Latex    Isolation: None   Infection: None   Code Status: Prior    Ht: 165.1 cm (65\")   Wt: 68 kg (150 lb)    Admission Cmt: None   Principal Problem: None                Active Insurance as of 2021     Primary Coverage     Payor Plan Insurance Group Employer/Plan Group    [place]      Payor Plan Address Payor Plan Phone Number Payor Plan Fax Number Effective Dates    [address] [address] [phone]  1/3/2018 - None Entered    [facility] [address]       Subscriber Name Subscriber Birth Date Member ID       MITALI RM 1980 [id number]                 Emergency Contacts      (Rel.) Home Phone Work Phone Mobile Phone    Justina Pollard (Daughter) -- -- [phone]               Discharge Summary      Leighann Ross MD at 21 0842          2021  HD:1  40 y.o. female  at 20w1d    Subjective   Mitali has no complaints. She has only had a small amount of pink blood with wiping. +FM.   US with PDC yesterday normal, except a 2cm CHRISTOPHER at leading edgeof placenta. No previa. CL normal.        Objective   Temp: Temp:  [97.7 °F (36.5 °C)-98.2 °F (36.8 °C)] 97.7 °F (36.5 °C) Temp src: Axillary   BP: BP: ()/(48-65) 104/57        Pulse: Heart Rate:  [68-81] 80  RR: Resp:  [15-16] 16    General:  nad   Abdomen: " Gravid, nontender         Lab Results   Component Value Date    WBC 8.52 2021    HGB 11.7 (L) 2021    HCT 34.2 2021    MCV 89.5 2021     2021    HEPBSAG Negative 2021                 +FHT    Assessment  1.   40 y.o. yo female  at 20w1d  2. Small CHRISTOPHER with bleeding. Stable.     Plan  Will stop magnesium, and plan on home later today as long as remains stable. Pelvic rest, and decreased activity. She has FU sched next week.       This note has been electronically signed.    Leighann Ross MD  2021    Electronically signed by Leighann Ross MD at 21 0842

## 2021-09-07 ENCOUNTER — ROUTINE PRENATAL (OUTPATIENT)
Dept: OBSTETRICS AND GYNECOLOGY | Facility: CLINIC | Age: 41
End: 2021-09-07

## 2021-09-07 VITALS — BODY MASS INDEX: 24.79 KG/M2 | WEIGHT: 149 LBS | DIASTOLIC BLOOD PRESSURE: 64 MMHG | SYSTOLIC BLOOD PRESSURE: 110 MMHG

## 2021-09-07 DIAGNOSIS — Z3A.20 20 WEEKS GESTATION OF PREGNANCY: ICD-10-CM

## 2021-09-07 DIAGNOSIS — E07.9 THYROID DYSFUNCTION IN PREGNANCY, ANTEPARTUM: Primary | ICD-10-CM

## 2021-09-07 DIAGNOSIS — O41.8X20 SUBCHORIONIC HEMATOMA IN SECOND TRIMESTER, SINGLE OR UNSPECIFIED FETUS: ICD-10-CM

## 2021-09-07 DIAGNOSIS — O46.8X2 SUBCHORIONIC HEMATOMA IN SECOND TRIMESTER, SINGLE OR UNSPECIFIED FETUS: ICD-10-CM

## 2021-09-07 DIAGNOSIS — O99.280 THYROID DYSFUNCTION IN PREGNANCY, ANTEPARTUM: Primary | ICD-10-CM

## 2021-09-07 PROCEDURE — 99213 OFFICE O/P EST LOW 20 MIN: CPT | Performed by: NURSE PRACTITIONER

## 2021-09-07 NOTE — PROGRESS NOTES
OB FOLLOW UP  CC- Here for care of pregnancy        Mitali Glover is a 40 y.o.  20w6d patient being seen today for her obstetrical follow up visit. Patient reports she has continued to have scant bright red bleeding and occasional nausea. She was discharged from L&D on  for CHRISTOPHER and vaginal bleeding and possible JOJO.  Pt did not leave urine sample this morning.     Her prenatal care is complicated by (and status) :    Patient Active Problem List   Diagnosis   • Anxiety   • Bipolar depression (CMS/HCC)   • Elevated antinuclear antibody (JAN) level   • Gastroesophageal reflux disease without esophagitis   • Hypothyroidism   • Insomnia   • Plantar fasciitis, bilateral   • Antepartum multigravida of advanced maternal age   • History of pregnancy induced hypertension   • History of recent trauma   • Vaginal bleeding in pregnancy, second trimester       Ultrasound Today: no    ROS -   Patient Denies: Loss of Fluid, Vision Changes, Headaches, Nausea , Vomiting  and Contractions  Fetal Movement : normal  All other systems reviewed and are negative.       The additional following portions of the patient's history were reviewed and updated as appropriate: allergies, current medications, past family history, past medical history, past social history, past surgical history and problem list.    I have reviewed and agree with the HPI, ROS, and historical information as entered above. Chanel Self, APRN    /64   Wt 67.6 kg (149 lb)   LMP 2021 (Exact Date)   BMI 24.79 kg/m²       EXAM:     FHT: wnl BPM     Urine glucose/protein: See prenatal flowsheet       Assessment and Plan    Problem List Items Addressed This Visit     None      Visit Diagnoses     Thyroid dysfunction in pregnancy, antepartum    -  Primary    Relevant Orders    TSH    Subchorionic hematoma in second trimester, single or unspecified fetus        Relevant Orders    US Ob Limited 1 + Fetuses    20 weeks gestation of pregnancy               1. Pregnancy at 20w6d  2. Fetal status reassuring.   MBT +  3. Activity and Exercise discussed.  Rev pelvic rest, no lifting, straining.  Return in about 4 weeks (around 10/5/2021) for US DRAGAN Self, APRN  09/07/2021

## 2021-09-08 LAB — TSH SERPL DL<=0.005 MIU/L-ACNC: 11.1 UIU/ML (ref 0.45–4.5)

## 2021-09-09 ENCOUNTER — TELEPHONE (OUTPATIENT)
Dept: OBSTETRICS AND GYNECOLOGY | Facility: CLINIC | Age: 41
End: 2021-09-09

## 2021-09-09 DIAGNOSIS — Z3A.21 21 WEEKS GESTATION OF PREGNANCY: Primary | ICD-10-CM

## 2021-09-12 ENCOUNTER — HOSPITAL ENCOUNTER (EMERGENCY)
Facility: HOSPITAL | Age: 41
Discharge: STILL ADMITTED OR ADMINISTRATIVE | End: 2021-09-12

## 2021-09-12 ENCOUNTER — HOSPITAL ENCOUNTER (OUTPATIENT)
Facility: HOSPITAL | Age: 41
Setting detail: OBSERVATION
Discharge: HOME | End: 2021-09-13
Attending: OBSTETRICS & GYNECOLOGY | Admitting: OBSTETRICS & GYNECOLOGY

## 2021-09-12 VITALS
RESPIRATION RATE: 16 BRPM | TEMPERATURE: 97.8 F | WEIGHT: 150 LBS | OXYGEN SATURATION: 100 % | HEIGHT: 65 IN | SYSTOLIC BLOOD PRESSURE: 109 MMHG | BODY MASS INDEX: 24.99 KG/M2 | DIASTOLIC BLOOD PRESSURE: 58 MMHG | HEART RATE: 84 BPM

## 2021-09-12 PROBLEM — N93.9 VAGINAL BLEEDING: Status: ACTIVE | Noted: 2021-09-12

## 2021-09-12 LAB
ABO GROUP BLD: NORMAL
BLD GP AB SCN SERPL QL: NEGATIVE
DEPRECATED RDW RBC AUTO: 43 FL (ref 37–54)
ERYTHROCYTE [DISTWIDTH] IN BLOOD BY AUTOMATED COUNT: 13.2 % (ref 12.3–15.4)
FIBRINOGEN PPP-MCNC: 404 MG/DL (ref 220–470)
HCT VFR BLD AUTO: 34.3 % (ref 34–46.6)
HGB BLD-MCNC: 11.9 G/DL (ref 12–15.9)
MCH RBC QN AUTO: 31.4 PG (ref 26.6–33)
MCHC RBC AUTO-ENTMCNC: 34.7 G/DL (ref 31.5–35.7)
MCV RBC AUTO: 90.5 FL (ref 79–97)
PLATELET # BLD AUTO: 271 10*3/MM3 (ref 140–450)
PMV BLD AUTO: 9.7 FL (ref 6–12)
RBC # BLD AUTO: 3.79 10*6/MM3 (ref 3.77–5.28)
RH BLD: POSITIVE
SARS-COV-2 RDRP RESP QL NAA+PROBE: NORMAL
T&S EXPIRATION DATE: NORMAL
T4 FREE SERPL-MCNC: 1.18 NG/DL (ref 0.93–1.7)
TSH SERPL DL<=0.05 MIU/L-ACNC: 3.76 UIU/ML (ref 0.27–4.2)
WBC # BLD AUTO: 10.09 10*3/MM3 (ref 3.4–10.8)

## 2021-09-12 PROCEDURE — 86850 RBC ANTIBODY SCREEN: CPT | Performed by: OBSTETRICS & GYNECOLOGY

## 2021-09-12 PROCEDURE — G0378 HOSPITAL OBSERVATION PER HR: HCPCS

## 2021-09-12 PROCEDURE — 84439 ASSAY OF FREE THYROXINE: CPT | Performed by: OBSTETRICS & GYNECOLOGY

## 2021-09-12 PROCEDURE — 85027 COMPLETE CBC AUTOMATED: CPT | Performed by: OBSTETRICS & GYNECOLOGY

## 2021-09-12 PROCEDURE — 84443 ASSAY THYROID STIM HORMONE: CPT | Performed by: OBSTETRICS & GYNECOLOGY

## 2021-09-12 PROCEDURE — 86900 BLOOD TYPING SEROLOGIC ABO: CPT | Performed by: OBSTETRICS & GYNECOLOGY

## 2021-09-12 PROCEDURE — 99219 PR INITIAL OBSERVATION CARE/DAY 50 MINUTES: CPT | Performed by: OBSTETRICS & GYNECOLOGY

## 2021-09-12 PROCEDURE — 85384 FIBRINOGEN ACTIVITY: CPT | Performed by: OBSTETRICS & GYNECOLOGY

## 2021-09-12 PROCEDURE — 86901 BLOOD TYPING SEROLOGIC RH(D): CPT | Performed by: OBSTETRICS & GYNECOLOGY

## 2021-09-12 PROCEDURE — 87635 SARS-COV-2 COVID-19 AMP PRB: CPT | Performed by: OBSTETRICS & GYNECOLOGY

## 2021-09-12 PROCEDURE — C9803 HOPD COVID-19 SPEC COLLECT: HCPCS

## 2021-09-12 RX ORDER — SODIUM CHLORIDE 0.9 % (FLUSH) 0.9 %
10 SYRINGE (ML) INJECTION EVERY 12 HOURS SCHEDULED
Status: DISCONTINUED | OUTPATIENT
Start: 2021-09-12 | End: 2021-09-12

## 2021-09-12 RX ORDER — OMEGA-3S/DHA/EPA/FISH OIL/D3 300MG-1000
400 CAPSULE ORAL DAILY
COMMUNITY

## 2021-09-12 RX ORDER — SODIUM CHLORIDE 0.9 % (FLUSH) 0.9 %
1-10 SYRINGE (ML) INJECTION AS NEEDED
Status: DISCONTINUED | OUTPATIENT
Start: 2021-09-12 | End: 2021-09-12

## 2021-09-12 RX ORDER — PRENATAL VIT/IRON FUM/FOLIC AC 27MG-0.8MG
1 TABLET ORAL DAILY
Status: DISCONTINUED | OUTPATIENT
Start: 2021-09-12 | End: 2021-09-13 | Stop reason: HOSPADM

## 2021-09-12 RX ADMIN — LEVOTHYROXINE SODIUM 175 MCG: 100 TABLET ORAL at 20:37

## 2021-09-12 RX ADMIN — PRENATAL VITAMINS-IRON FUMARATE 27 MG IRON-FOLIC ACID 0.8 MG TABLET 1 TABLET: at 20:37

## 2021-09-12 NOTE — H&P
[facility]  Obstetric History and Physical    Referring Provider: Leighann Ross MD      Chief Complaint   Patient presents with   • Vaginal Bleeding       Subjective     Patient is a 40 y.o. female  currently at 21w4d, who presents with c/o vaginal bleeding.  Patient reports having bright red vaginal bleeding yesterday, moderate amounts(light menses) without abdominal pain.  She reports having another episode this morning of bright red bleeding without abdominal cramping.  Patient denies recent intercourse.  Patient denies recent trauma, fever, urinary symptoms, or any other concerns.  Patient admitted on  after abdominal pain and vaginal bleeding.  PDC confirmed subchorionic hemorrhage.  Patient has had no subsequent vaginal bleeding until yesterday.  Past medical history significant for hypothyroidism, history of preeclampsia with previous pregnancies, history of  delivery, and AMA.  Patient lives 1-1/2 hours from [facility].        The following portions of the patients history were reviewed and updated as appropriate: current medications, allergies, past medical history, past surgical history, past family history, past social history and problem list .       Prenatal Information:   Maternal Prenatal Labs  Blood Type No results found for: ABO   Rh Status No results found for: RH   Antibody Screen No results found for: ABSCRN   Gonnorhea No results found for: GCCX   Chlamydia No results found for: CLAMYDCU   RPR No results found for: RPR   Syphilis Antibody No results found for: SYPHILIS   Rubella No results found for: RUBELLAIGGIN   Hepatitis B Surface Antigen No results found for: HEPBSAG   HIV-1 Antibody No results found for: LABHIV1   Hepatitis C Antibody No results found for: HEPCAB   Rapid Urin Drug Screen No results found for: AMPMETHU, BARBITSCNUR, LABBENZSCN, LABMETHSCN, LABOPIASCN, THCURSCR, COCAINEUR, AMPHETSCREEN, PROPOXSCN, BUPRENORSCNU, METAMPSCNUR,  OXYCODONESCN, TRICYCLICSCN   Group B Strep Culture No results found for: GBSANTIGEN           External Prenatal Results     Pregnancy Outside Results - Transcribed From Office Records - See Scanned Records For Details     Test Value Date Time    ABO  A  21 0151    Rh  Positive  21 0151    Antibody Screen  Negative  21 0058       Negative  21 1646    Varicella IgG       Rubella  1.32 index 21 1646    Hgb  11.7 g/dL 21 0058       12.8 g/dL 21 1646    Hct  34.2 % 21 0058       38.2 % 21 1646    Glucose Fasting GTT       Glucose Tolerance Test 1 hour       Glucose Tolerance Test 3 hour       Gonorrhea (discrete)  Negative  21     Chlamydia (discrete)  Negative  21     RPR  Non Reactive  21 1646    VDRL       Syphilis Antibody       HBsAg  Negative  21 1646    Herpes Simplex Virus PCR       Herpes Simplex VIrus Culture       HIV  Non Reactive  21 1646    Hep C RNA Quant PCR       Hep C Antibody  <0.1 s/co ratio 21 1646    AFP  38.8 ng/mL 21 1358    Group B Strep       GBS Susceptibility to Clindamycin       GBS Susceptibility to Erythromycin       Fetal Fibronectin       Genetic Testing, Maternal Blood             Drug Screening     Test Value Date Time    Urine Drug Screen       Amphetamine Screen  Negative ng/mL 21 1646    Barbiturate Screen  Negative ng/mL 21 1646    Benzodiazepine Screen  Negative ng/mL 21 1646    Methadone Screen  Negative ng/mL 21 1646    Phencyclidine Screen  Negative ng/mL 21 1646    Opiates Screen       THC Screen       Cocaine Screen       Propoxyphene Screen  Negative ng/mL 21 1646    Buprenorphine Screen       Methamphetamine Screen       Oxycodone Screen       Tricyclic Antidepressants Screen             Legend    ^: Historical                          Past OB History:       OB History    Para Term  AB Living   8 5 4 1 2 5   SAB TAB Ectopic Molar  Multiple Live Births   1 0 1 0 0 5      # Outcome Date GA Lbr Awais/2nd Weight Sex Delivery Anes PTL Lv   8 Current            7 SAB 21 6w0d          6  11 35w0d  2580 g (5 lb 11 oz) M Vag-Spont   JEWELS      Complications: Preeclampsia   5 Term 06 39w0d  3629 g (8 lb) F Vag-Spont   JEWELS   4 Term 05 38w0d  3232 g (7 lb 2 oz) F Vag-Spont  N JEWELS   3 Term 03 40w0d  3629 g (8 lb) F Vag-Spont  Y JEWELS   2 Ectopic 02           1 Term 00 39w3d  3487 g (7 lb 11 oz) F Vag-Spont  Y JEWELS       Past Medical History: Past Medical History:   Diagnosis Date   • Hashimoto's disease    • Hx of preeclampsia, prior pregnancy, currently pregnant, unspecified trimester    • Hypothyroid    • Preeclampsia     Hx of w/ previous pregnancy      Past Surgical History Past Surgical History:   Procedure Laterality Date   • CARDIAC SURGERY      cyst removal   • CHOLECYSTECTOMY     • OTHER SURGICAL HISTORY Left     Arm surgery    • WISDOM TOOTH EXTRACTION        Family History: Family History   Problem Relation Age of Onset   • Hypertension Father    • Hyperlipidemia Father    • Hypothyroidism Father    • Diabetes Father    • Hypertension Mother    • Hyperlipidemia Mother    • Breast cancer Mother    • Pancreatic cancer Mother    • Hypothyroidism Mother    • Diabetes Mother    • Hypertension Brother    • Hyperlipidemia Brother    • Hypothyroidism Brother    • Hypertension Sister    • Hyperlipidemia Sister    • Hypothyroidism Sister    • Diabetes Sister    • Colon cancer Maternal Grandfather    • Hypothyroidism Maternal Grandfather    • Hypothyroidism Maternal Grandmother    • Hypothyroidism Paternal Grandmother    • Hypothyroidism Paternal Grandfather    • Breast cancer Paternal Aunt       Social History:  reports that she has never smoked. She has never used smokeless tobacco.   reports previous alcohol use.   reports no history of drug use.                   General ROS Negative Findings:Headaches, Visual  Changes, Epigastric pain, Anorexia, Nausia/Vomiting and ROM    ROS     All other systems have been reviewed and are neg  Objective       Vital Signs Range for the last 24 hours  Temperature: Temp:  [97.8 °F (36.6 °C)-98.3 °F (36.8 °C)] 98.3 °F (36.8 °C)   Temp Source: Temp src: Oral   BP: BP: (109-111)/(55-58) 111/55   Pulse: Heart Rate:  [77-84] 77   Respirations: Resp:  [16] 16   SPO2: SpO2:  [100 %] 100 %   O2 Amount (l/min):     O2 Devices     Weight: Weight:  [68 kg (150 lb)] 68 kg (150 lb)     Physical Examination:   General:   alert, appears stated age and cooperative   Skin:   normal   HEENT:  Sclera clear   Lungs:   clear to auscultation bilaterally   Heart:   regular rate and rhythm, S1, S2 normal, no murmur, click, rub or gallop   Gastrointestinal:  Abdomen soft, gravid uterus, no guarding, rebound benign exam   Lower Extremities:  No edema, no calf test range of motion   : External genitalia: normal general appearance  Uterus: enlarged   Musculoskeletal:   Full range of motion upper lower extremity.   Neuro:  Focal deficits, DTR 2+4 no clonus         Presentation:    Cervix: Exam by:     Dilation:  Closed   Effacement:  Thick firm   Station:  Not engaged  Small amount of blood noted on glove       Fetal Heart Rate Assessment   Method:     Beats/min:     Baseline:     Varibility:     Accels:     Decels:     Tracing Category:       Uterine Assessment   Method:     Frequency (min):     Ctx Count in 10 min:     Duration:     Intensity:     Intensity by IUPC:     Resting Tone:     Resting Tone by IUPC:     [place] Units:       Laboratory Results:   Lab Results (last 24 hours)     ** No results found for the last 24 hours. **        Radiology Review:   Imaging Results (Last 24 Hours)     ** No results found for the last 24 hours. **        Other Studies:    Assessment/Plan       Hypothyroidism    Antepartum multigravida of advanced maternal age    Vaginal bleeding in pregnancy, second trimester    Vaginal  bleeding        Assessment:  1.  Intrauterine pregnancy at 21w4d weeks gestation with reassuring fetal status.    2.  Recurrent vaginal bleeding (history of subchorionic hemorrhage)       Patient admitted on  treated with magnesium sulfate for vaginal bleeding and uterine contractions.  3.  AMA  4.  Hypothyroidism (elevated TSH on 21, appointment made for endocrinology)  5.  History of  delivery at 35 weeks gestation  6.  Patient lives one and half hours from [facility]  Plan:  1.  Admit observation, continuous toco, baseline labs, repeat TSH, T4, PDC scan in a.m.  2. Plan of care has been reviewed with patient.  3.  Risks, benefits of treatment plan have been discussed.  4.  All questions have been answered.  5   discussed with Dr. Harinder Xie DO  2021  18:02 EDT

## 2021-09-13 ENCOUNTER — TELEPHONE (OUTPATIENT)
Dept: OBSTETRICS AND GYNECOLOGY | Facility: CLINIC | Age: 41
End: 2021-09-13

## 2021-09-13 ENCOUNTER — HOSPITAL ENCOUNTER (OUTPATIENT)
Facility: HOSPITAL | Age: 41
End: 2021-09-13
Attending: OBSTETRICS & GYNECOLOGY | Admitting: OBSTETRICS & GYNECOLOGY

## 2021-09-13 ENCOUNTER — APPOINTMENT (OUTPATIENT)
Dept: WOMENS IMAGING | Facility: HOSPITAL | Age: 41
End: 2021-09-13

## 2021-09-13 VITALS
DIASTOLIC BLOOD PRESSURE: 55 MMHG | TEMPERATURE: 98.1 F | RESPIRATION RATE: 16 BRPM | HEART RATE: 80 BPM | SYSTOLIC BLOOD PRESSURE: 108 MMHG

## 2021-09-13 PROBLEM — N93.9 VAGINAL BLEEDING: Status: RESOLVED | Noted: 2021-09-12 | Resolved: 2021-09-13

## 2021-09-13 PROCEDURE — G0378 HOSPITAL OBSERVATION PER HR: HCPCS

## 2021-09-13 PROCEDURE — 76816 OB US FOLLOW-UP PER FETUS: CPT | Performed by: OBSTETRICS & GYNECOLOGY

## 2021-09-13 PROCEDURE — 76816 OB US FOLLOW-UP PER FETUS: CPT

## 2021-09-13 RX ORDER — ACETAMINOPHEN 325 MG/1
650 TABLET ORAL EVERY 4 HOURS PRN
Status: DISCONTINUED | OUTPATIENT
Start: 2021-09-13 | End: 2021-09-13 | Stop reason: HOSPADM

## 2021-09-13 RX ADMIN — LEVOTHYROXINE SODIUM 175 MCG: 100 TABLET ORAL at 08:26

## 2021-09-13 RX ADMIN — ACETAMINOPHEN 650 MG: 325 TABLET ORAL at 04:26

## 2021-09-13 RX ADMIN — PRENATAL VITAMINS-IRON FUMARATE 27 MG IRON-FOLIC ACID 0.8 MG TABLET 1 TABLET: at 08:26
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
## 2021-09-13 NOTE — TELEPHONE ENCOUNTER
Maddie from antepartum called and said patient hasnt heard anything from her referral for the endocrinology, she was wanting a nurse to call her bacl [phone]

## 2021-09-28 ENCOUNTER — ROUTINE PRENATAL (OUTPATIENT)
Dept: OBSTETRICS AND GYNECOLOGY | Facility: CLINIC | Age: 41
End: 2021-09-28

## 2021-09-28 VITALS — BODY MASS INDEX: 25.39 KG/M2 | WEIGHT: 152.6 LBS | DIASTOLIC BLOOD PRESSURE: 64 MMHG | SYSTOLIC BLOOD PRESSURE: 94 MMHG

## 2021-09-28 DIAGNOSIS — E03.9 HYPOTHYROIDISM, UNSPECIFIED TYPE: ICD-10-CM

## 2021-09-28 DIAGNOSIS — F31.9 BIPOLAR DEPRESSION (HCC): ICD-10-CM

## 2021-09-28 DIAGNOSIS — Z87.59 HISTORY OF PREGNANCY INDUCED HYPERTENSION: ICD-10-CM

## 2021-09-28 DIAGNOSIS — Z34.90 PREGNANCY, UNSPECIFIED GESTATIONAL AGE: Primary | ICD-10-CM

## 2021-09-28 DIAGNOSIS — O09.529 ANTEPARTUM MULTIGRAVIDA OF ADVANCED MATERNAL AGE: ICD-10-CM

## 2021-09-28 DIAGNOSIS — Z87.828 HISTORY OF RECENT TRAUMA: ICD-10-CM

## 2021-09-28 DIAGNOSIS — O46.92 VAGINAL BLEEDING IN PREGNANCY, SECOND TRIMESTER: ICD-10-CM

## 2021-09-28 LAB
EXPIRATION DATE: 0
GLUCOSE UR STRIP-MCNC: NEGATIVE MG/DL
Lab: 0
PROT UR STRIP-MCNC: NEGATIVE MG/DL

## 2021-09-28 PROCEDURE — 99214 OFFICE O/P EST MOD 30 MIN: CPT | Performed by: OBSTETRICS & GYNECOLOGY
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
